# Patient Record
Sex: MALE | Race: WHITE | Employment: FULL TIME | URBAN - METROPOLITAN AREA
[De-identification: names, ages, dates, MRNs, and addresses within clinical notes are randomized per-mention and may not be internally consistent; named-entity substitution may affect disease eponyms.]

---

## 2019-06-04 ENCOUNTER — OFFICE VISIT (OUTPATIENT)
Dept: SLEEP MEDICINE | Age: 44
End: 2019-06-04

## 2019-06-04 VITALS
SYSTOLIC BLOOD PRESSURE: 124 MMHG | DIASTOLIC BLOOD PRESSURE: 73 MMHG | HEIGHT: 71 IN | RESPIRATION RATE: 21 BRPM | OXYGEN SATURATION: 94 % | HEART RATE: 86 BPM | BODY MASS INDEX: 37.66 KG/M2 | WEIGHT: 269 LBS

## 2019-06-04 DIAGNOSIS — G47.33 OSA (OBSTRUCTIVE SLEEP APNEA): Primary | ICD-10-CM

## 2019-06-04 DIAGNOSIS — E66.01 SEVERE OBESITY (HCC): ICD-10-CM

## 2019-06-04 RX ORDER — MOMETASONE FUROATE 50 UG/1
2 SPRAY, METERED NASAL DAILY
COMMUNITY

## 2019-06-04 RX ORDER — CETIRIZINE HCL 10 MG
TABLET ORAL
COMMUNITY

## 2019-06-04 RX ORDER — ESCITALOPRAM OXALATE 10 MG/1
10 TABLET ORAL DAILY
COMMUNITY
End: 2022-09-27

## 2019-06-04 RX ORDER — BUPROPION HYDROCHLORIDE 150 MG/1
150 TABLET ORAL
COMMUNITY

## 2019-06-04 RX ORDER — ATORVASTATIN CALCIUM 20 MG/1
TABLET, FILM COATED ORAL DAILY
COMMUNITY

## 2019-06-04 NOTE — PROGRESS NOTES
[address] [place], [address]  Tel.  [phone]  Fax. [address] [facility] [address]  [place], [address]  Tel.  [phone]  Fax. [phone] [address]  Tel.  [phone]  Fax. [phone]       Chief Complaint       Chief Complaint   Patient presents with    Sleep Problem     Np self refd, on pap, bring machine        HPI      Wilian Zhu is 39 y.o. male seen for evaluation of a sleep disorder. He had an initial evaluation years ago at [facility] where he was diagnosed with sleep apnea. He has continued on the same unit. He has been obtaining supplies online. Unit broken and was unable to provide compliance data. He works as a . When not working he will retire between 9-10 PM and awaken at 7 AM.  He notes that he may be fatigued during the day if he is an active such as when reading or watching TV. He has a history of snoring and associated apnea. He notes nightmares and vivid dreaming and bruxism. He denies sleep talking or sleepwalking, nocturnal incontinence, headache on awakening, hypnagogic hallucinations, sleep paralysis or cataplexy. The patient has not undergone recent diagnostic testing for the current problems. [place] Sleepiness Score: 14       Allergies   Allergen Reactions    Penicillins Nausea and Vomiting       Current Outpatient Medications   Medication Sig Dispense Refill    buPROPion XL (WELLBUTRIN XL) 150 mg tablet Take 150 mg by mouth every morning.  escitalopram oxalate (LEXAPRO) 10 mg tablet Take 10 mg by mouth daily.  atorvastatin (LIPITOR) 20 mg tablet Take  by mouth daily.  mometasone (NASONEX) 50 mcg/actuation nasal spray 2 Sprays daily.  cetirizine (ZYRTEC) 10 mg tablet Take  by mouth. He  has no past medical history on file. He  has no past surgical history on file. He family history is not on file.     He reports that he has quit smoking. He has never used smokeless tobacco. He reports that he drinks alcohol. Review of Systems:  Review of Systems   Constitutional: Negative for chills and fever. HENT: Positive for hearing loss, sore throat and tinnitus. Eyes: Negative for blurred vision and double vision. Respiratory: Negative for cough and shortness of breath. Cardiovascular: Negative for chest pain and palpitations. Gastrointestinal: Positive for heartburn. Genitourinary: Negative for frequency and urgency. Musculoskeletal: Positive for back pain and neck pain. Skin: Positive for itching and rash. Neurological: Negative for dizziness and headaches. Psychiatric/Behavioral: Positive for depression and memory loss. Objective:     Visit Vitals  /73 (BP 1 Location: Left arm, BP Patient Position: Sitting)   Pulse 86   Resp 21   Ht 5' 11\" (1.803 m)   Wt 269 lb (122 kg)   SpO2 94%   BMI 37.52 kg/m²     Body mass index is 37.52 kg/m². General:   Conversant, cooperative   Eyes:  Pupils equal and reactive, no nystagmus   Oropharynx:   Mallampati score III, tongue normal   Tonsils:   t   Neck:   No carotid bruits; Neck circ. in \"inches\": 17   Chest/Lungs:  Clear on auscultation    CVS:  Normal rate, regular rhythm   Skin:  Warm to touch; no obvious rashes   Neuro:  Speech fluent, face symmetrical, tongue movement normal   Psych:  Normal affect,  normal countenance        Assessment:       ICD-10-CM ICD-9-CM    1. ISABEL (obstructive sleep apnea) G47.33 327.23 SLEEP STUDY UNATTENDED, 4 CHANNEL   2. Severe obesity (HCC) E66.01 278.01        History of sleep disordered breathing. CPAP unit not providing compliance data. He will be reevaluated with a home sleep test.  Results will be reviewed with him.     Plan:     Orders Placed This Encounter    SLEEP STUDY UNATTENDED, 4 CHANNEL     Order Specific Question:   Reason for Exam     Answer:   History of sleep apnea, reevaluation       * Patient has a history and examination consistent with the diagnosis of sleep apnea. *Home sleep testing was ordered for initial evaluation. * He was provided information on sleep apnea including corresponding risk factors and the importance of proper treatment. * Treatment options if indicated were reviewed today. Instructions:  o The patient would benefit from weight reduction measures. o Do not engage in activities requiring a normal degree of alertness if fatigue is present. o The patient understands that untreated or undertreated sleep apnea could impair judgement and the ability to function normally during the day.  o Call or return if symptoms worsen or persist.          Ritu Hughes MD, FAASM  Electronically signed 06/04/19       This note was created using voice recognition software. Despite editing, there may be syntax errors. This note will not be viewable in [address]

## 2019-06-04 NOTE — PATIENT INSTRUCTIONS

## 2019-06-06 ENCOUNTER — DOCUMENTATION ONLY (OUTPATIENT)
Dept: SLEEP MEDICINE | Age: 44
End: 2019-06-06

## 2019-06-11 ENCOUNTER — TELEPHONE (OUTPATIENT)
Dept: SLEEP MEDICINE | Age: 44
End: 2019-06-11

## 2019-06-11 DIAGNOSIS — G47.33 OSA (OBSTRUCTIVE SLEEP APNEA): Primary | ICD-10-CM

## 2019-06-11 NOTE — TELEPHONE ENCOUNTER
HSAT Returned-[facility]    Date of Study: 6/4/19    The following information was gathered from the patients study log:  Further information provided:Log scanned into media    Flow and oximetry was poor for a portion of the study.

## 2019-06-12 ENCOUNTER — TELEPHONE (OUTPATIENT)
Dept: SLEEP MEDICINE | Age: 44
End: 2019-06-12

## 2019-06-12 NOTE — TELEPHONE ENCOUNTER
HSAT demonstrated severe sleep disordered breathing characterized by an AHI of 46.7/h associated with minimal SaO2 of 75%. Snoring during 16.4% of the study.     Recommendation: APAP 7-16 cm.     Chief technologist: Please review the HSAT results with the patient. Order has been generated for APAP 7-16 cm. Please schedule first compliance appointment.

## 2019-06-17 ENCOUNTER — DOCUMENTATION ONLY (OUTPATIENT)
Dept: SLEEP MEDICINE | Age: 44
End: 2019-06-17

## 2019-09-06 ENCOUNTER — OFFICE VISIT (OUTPATIENT)
Dept: SLEEP MEDICINE | Age: 44
End: 2019-09-06

## 2019-09-06 VITALS
HEIGHT: 71 IN | HEART RATE: 76 BPM | BODY MASS INDEX: 37.94 KG/M2 | OXYGEN SATURATION: 98 % | DIASTOLIC BLOOD PRESSURE: 73 MMHG | SYSTOLIC BLOOD PRESSURE: 131 MMHG | WEIGHT: 271 LBS

## 2019-09-06 DIAGNOSIS — G47.33 OSA (OBSTRUCTIVE SLEEP APNEA): Primary | ICD-10-CM

## 2019-09-06 NOTE — PATIENT INSTRUCTIONS
[address] [facility] [address] [place], [address]  Tel.  [phone]  Fax. 100 [facility] 60  [place], [address] [facility]  Tel.  [phone]  Fax. [phone] [address]  Tel.  [phone]  Fax. [phone]     Learning About CPAP for Sleep Apnea  What is CPAP? CPAP is a small machine that you use at home every night while you sleep. It increases air pressure in your throat to keep your airway open. When you have sleep apnea, this can help you sleep better so you feel much better. CPAP stands for \"continuous positive airway pressure. \"  The CPAP machine will have one of the following:  · A mask that covers your nose and mouth  · Prongs that fit into your nose  · A mask that covers your nose only, the most common type. This type is called NCPAP. The N stands for \"nasal.\"  Why is it done? CPAP is usually the best treatment for obstructive sleep apnea. It is the first treatment choice and the most widely used. Your doctor may suggest CPAP if you have:  · Moderate to severe sleep apnea. · Sleep apnea and coronary artery disease (CAD) or heart failure. How does it help? · CPAP can help you have more normal sleep, so you feel less sleepy and more alert during the daytime. · CPAP may help keep heart failure or other heart problems from getting worse. · NCPAP may help lower your blood pressure. · If you use CPAP, your bed partner may also sleep better because you are not snoring or restless. What are the side effects? Some people who use CPAP have:  · A dry or stuffy nose and a sore throat. · Irritated skin on the face. · Sore eyes. · Bloating. If you have any of these problems, work with your doctor to fix them. Here are some things you can try:  · Be sure the mask or nasal prongs fit well. · See if your doctor can adjust the pressure of your CPAP. · If your nose is dry, try a humidifier.   · If your nose is runny or stuffy, try decongestant medicine or a steroid nasal spray. If these things do not help, you might try a different type of machine. Some machines have air pressure that adjusts on its own. Others have air pressures that are different when you breathe in than when you breathe out. This may reduce discomfort caused by too much pressure in your nose. Where can you learn more? Go to SHADOW.be  Enter Nicolasa Haynes in the search box to learn more about \"Learning About CPAP for Sleep Apnea. \"   © 0954-9379 Healthwise, Incorporated. Care instructions adapted under license by [facility] [employer] (which disclaims liability or warranty for this information). This care instruction is for use with your licensed healthcare professional. If you have questions about a medical condition or this instruction, always ask your healthcare professional. [address] any warranty or liability for your use of this information. Content Version: 2.8.08328; Last Revised: January 11, 2010  PROPER SLEEP HYGIENE    What to avoid  · Do not have drinks with caffeine, such as coffee or black tea, for 8 hours before bed. · Do not smoke or use other types of tobacco near bedtime. Nicotine is a stimulant and can keep you awake. · Avoid drinking alcohol late in the evening, because it can cause you to wake in the middle of the night. · Do not eat a big meal close to bedtime. If you are hungry, eat a light snack. · Do not drink a lot of water close to bedtime, because the need to urinate may wake you up during the night. · Do not read or watch TV in bed. Use the bed only for sleeping and sexual activity. What to try  · Go to bed at the same time every night, and wake up at the same time every morning. Do not take naps during the day. · Keep your bedroom quiet, dark, and cool. · Get regular exercise, but not within 3 to 4 hours of your bedtime. .  · Sleep on a comfortable pillow and mattress.   · If watching the clock makes you anxious, turn it facing away from you so you cannot see the time. · If you worry when you lie down, start a worry book. Well before bedtime, write down your worries, and then set the book and your concerns aside. · Try meditation or other relaxation techniques before you go to bed. · If you cannot fall asleep, get up and go to another room until you feel sleepy. Do something relaxing. Repeat your bedtime routine before you go to bed again. · Make your house quiet and calm about an hour before bedtime. Turn down the lights, turn off the TV, log off the computer, and turn down the volume on music. This can help you relax after a busy day. Drowsy Driving: The [employer] cites drowsiness as a causing factor in more than 892,308 police reported crashes annually, resulting in 76,000 injuries and 1,500 deaths. Other surveys suggest 55% of people polled have driven while drowsy in the past year, 23% had fallen asleep but not crashed, 3% crashed, and 2% had and accident due to drowsy driving. Who is at risk? Young Drivers: One study of drowsy driving accidents states that 55% of the drivers were under 25 years. Of those, 75% were male. Shift Workers and Travelers: People who work overnight or travel across time zones frequently are at higher risk of experiencing Circadian Rhythm Disorders. They are trying to work and function when their body is programed to sleep. Sleep Deprived: Lack of sleep has a serious impact on your ability to pay attention or focus on a task. Consistently getting less than the average of 8 hours your body needs creates partial or cumulative sleep deprivation. Untreated Sleep Disorders: Sleep Apnea, Narcolepsy, R.L.S., and other sleep disorders (untreated) prevent a person from getting enough restful sleep. This leads to excessive daytime sleepiness and increases the risk for drowsy driving accidents by up to 7 times.   Medications / Alcohol: Even over the counter medications can cause drowsiness. Medications that impair a drivers attention should have a warning label. Alcohol naturally makes you sleepy and on its own can cause accidents. Combined with excessive drowsiness its effects are amplified. Signs of Drowsy Driving:   * You don't remember driving the last few miles   * You may drift out of your edwin   * You are unable to focus and your thoughts wander   * You may yawn more often than normal   * You have difficulty keeping your eyes open / nodding off   * Missing traffic signs, speeding, or tailgating  Prevention-   Good sleep hygiene, lifestyle and behavioral choices have the most impact on drowsy driving. There is no substitute for sleep and the average person requires 8 hours nightly. If you find yourself driving drowsy, stop and sleep. Consider the sleep hygiene tips provided during your visit as well. Medication Refill Policy: Refills for all medications require 1 week advance notice. Please have your pharmacy fax a refill request. We are unable to fax, or call in \"controled substance\" medications and you will need to pick these prescriptions up from our office. Dittit Activation    Thank you for requesting access to Dittit. Please follow the instructions below to securely access and download your online medical record. Dittit allows you to send messages to your doctor, view your test results, renew your prescriptions, schedule appointments, and more. How Do I Sign Up? 1. In your internet browser, go to [email or web address]. 2. Click on the First Time User? Click Here link in the Sign In box. You will see the New Member Sign Up page. 3. Enter your Dittit Access Code exactly as it appears below. You will not need to use this code after youve completed the sign-up process. If you do not sign up before the expiration date, you must request a new code.     Dittit Access Code: [email or web address]  Expires: 10/21/2019  8:46 AM (This is the date your ExoYou access code will )    4. Enter the last four digits of your Social Security Number (xxxx) and Date of Birth (mm/dd/yyyy) as indicated and click Submit. You will be taken to the next sign-up page. 5. Create a ExoYou ID. This will be your ExoYou login ID and cannot be changed, so think of one that is secure and easy to remember. 6. Create a ExoYou password. You can change your password at any time. 7. Enter your Password Reset Question and Answer. This can be used at a later time if you forget your password. 8. Enter your e-mail address. You will receive e-mail notification when new information is available in [address] 9. Click Sign Up. You can now view and download portions of your medical record. 10. Click the Download Summary menu link to download a portable copy of your medical information. Additional Information    If you have questions, please call [phone]. Remember, ExoYou is NOT to be used for urgent needs. For medical emergencies, dial 911.

## 2019-09-06 NOTE — PROGRESS NOTES
[address] [facility] [address] [place], [address]   Tel.  [phone]   Fax. 100 [facility] 60   [place], [address] [facility]   Tel.  [phone]   Fax. [phone] [address]    Tel.  [phone]   Fax. [phone]       Subjective:   Jose Galindo is a 39 y.o. male seen for a positive airway pressure follow-up, last seen by Dr. Jennifer Wu on 6/14/2019, prior notes reviewed in detail. Home sleep test demonstrated severe sleep disordered breathing characterized by an AHI of 46.7/h associated with minimal SaO2 of 75%.  Snoring during 16.4% of the study. APAP device 7-16 ordered. He is here for his first adherence visit. He reports no problems using the device. The following concerns reviewed:    Drowsiness no Problems exhaling no   Snoring no Forget to put on no   Mask Comfortable yes Can't fall asleep no   Dry Mouth no Mask falls off no   Air Leaking no Frequent awakenings no       He admits that his sleep has improved on PAP therapy using nasal pillows mask and heated tubing. Auto pressure: 7-16 cmH2O. Compliance data downloaded and reviewed in detail with the patient today. CPAP used during 30 days with the average daily use of 8:48 hours. CMS Compliance % Used Days >= 4 hours: 100. 95th Percentile Leak: 13.2 L/Minute. Therapy Apnea Index averaged over PAP use: 0.8 /hr which reflects improved sleep breathing condition. [place] Sleepiness Score: 12 and Modified F.O.S.Q. Score Total / 2: 15.5       Allergies   Allergen Reactions    Penicillins Nausea and Vomiting       He has a current medication list which includes the following prescription(s): bupropion xl, escitalopram oxalate, atorvastatin, mometasone, and cetirizine. .      He  has no past medical history on file. Sleep Review of Systems: notable for Positive difficulty falling asleep;  Positive awakenings at night; Negative early morning headaches; Negative memory problems; Negative concentration issues; Negative chest pain; Negative shortness of breath; Negative significant joint pain at night; Negative significant muscle pain at night; Negative rashes or itching; Negative heartburn; Negative significant mood issues; 2-3 afternoon naps per week; Negative history of any automobile or occupational accidents due to daytime drowsiness      Objective:     Visit Vitals  /73 (BP 1 Location: Left arm, BP Patient Position: Sitting)   Pulse 76   Ht 5' 11\" (1.803 m)   Wt 271 lb (122.9 kg)   SpO2 98%   BMI 37.80 kg/m²            General:   Alert, oriented, not in acute distress   Eyes:  Anicteric Sclerae; no obvious strabismus   Nose:  No obvious nasal septum deviation    Oropharynx:   Mallampati score 4, thick tongue base, uvula not seen due to low-lying soft palate, narrow tonsilo-pharyngeal pilars   Neck:   Midline trachea   Chest/Lungs:  Symmetrical lung expansion, clear lung fields on auscultation    CVS:  Normal rate, regular rhythm,  no JVD   Extremities:  No obvious rashes, no edema    Neuro:  No focal deficits; No obvious tremor    Psych:  Normal affect,  normal countenance       Assessment:       ICD-10-CM ICD-9-CM    1. ISABEL (obstructive sleep apnea) G47.33 327.23    2. Adult BMI 37.0-37.9 kg/sq m Z68.37 V85.37        AHI = 46.7(6/2019). On APAP :  7-16 cmH2O. He is compliant with PAP therapy and PAP continues to benefit patient and remains necessary for control of his sleep apnea. Plan:     Follow-up and Dispositions    · Return in about 1 year (around 9/6/2020). * Continue on current pressures     * Counseling was provided regarding the importance of regular PAP use with emphasis on ensuring sufficient total sleep time, proper sleep hygiene, and safe driving. * He reports he is a  and gets calls at night when affects his sleep schedule. We discussed strategies for returning to sleep and falling asleep when he is awakened.     * Re-enforced proper and regular cleaning for the device. * He was asked to contact our office for any problems regarding PAP therapy. 2. Recommended a dedicated weight loss program through appropriate diet and exercise regimen as significant weight reduction has been shown to reduce severity of obstructive sleep apnea. Patient's phone number [phone] (home)  was reviewed and confirmed for accuracy. He gives permission for messages regarding results and appointments to be left at that number. MELODY Lares-BC, [address] [place] [address]  Electronically signed.  09/06/19

## 2020-08-20 ENCOUNTER — HOSPITAL ENCOUNTER (OUTPATIENT)
Dept: CT IMAGING | Age: 45
Discharge: HOME | End: 2020-08-20
Attending: NURSE PRACTITIONER
Payer: COMMERCIAL

## 2020-08-20 DIAGNOSIS — R51.9 HA (HEADACHE): ICD-10-CM

## 2020-08-20 PROCEDURE — 74011000636 HC RX REV CODE- 636: Performed by: RADIOLOGY

## 2020-08-20 PROCEDURE — 70470 CT HEAD/BRAIN W/O & W/DYE: CPT

## 2020-08-20 RX ORDER — SODIUM CHLORIDE 0.9 % (FLUSH) 0.9 %
10 SYRINGE (ML) INJECTION
Status: COMPLETED | OUTPATIENT
Start: 2020-08-20 | End: 2020-08-20

## 2020-08-20 RX ADMIN — IOPAMIDOL 100 ML: 755 INJECTION, SOLUTION INTRAVENOUS at 07:29

## 2020-08-20 RX ADMIN — Medication 10 ML: at 07:29

## 2020-09-22 ENCOUNTER — VIRTUAL VISIT (OUTPATIENT)
Dept: SLEEP MEDICINE | Age: 45
End: 2020-09-22
Payer: COMMERCIAL

## 2020-09-22 ENCOUNTER — DOCUMENTATION ONLY (OUTPATIENT)
Dept: SLEEP MEDICINE | Age: 45
End: 2020-09-22

## 2020-09-22 DIAGNOSIS — G47.33 OSA (OBSTRUCTIVE SLEEP APNEA): Primary | ICD-10-CM

## 2020-09-22 PROCEDURE — 99213 OFFICE O/P EST LOW 20 MIN: CPT | Performed by: INTERNAL MEDICINE

## 2020-09-22 NOTE — PATIENT INSTRUCTIONS
217 [facility]., [address] 1668 [facility], [address] Tel.  [phone] Fax. 100 [facility] 60 [place], 200 [address] [facility] Tel.  [phone] Fax. [phone] [address] [facility] Ruchi Garcia Tel.  [phone] Fax. [phone] PROPER SLEEP HYGIENE What to avoid · Do not have drinks with caffeine, such as coffee or black tea, for 8 hours before bed. · Do not smoke or use other types of tobacco near bedtime. Nicotine is a stimulant and can keep you awake. · Avoid drinking alcohol late in the evening, because it can cause you to wake in the middle of the night. · Do not eat a big meal close to bedtime. If you are hungry, eat a light snack. · Do not drink a lot of water close to bedtime, because the need to urinate may wake you up during the night. · Do not read or watch TV in bed. Use the bed only for sleeping and sexual activity. What to try · Go to bed at the same time every night, and wake up at the same time every morning. Do not take naps during the day. · Keep your bedroom quiet, dark, and cool. · Get regular exercise, but not within 3 to 4 hours of your bedtime. Mikhail Mccarthy · Sleep on a comfortable pillow and mattress. · If watching the clock makes you anxious, turn it facing away from you so you cannot see the time. · If you worry when you lie down, start a worry book. Well before bedtime, write down your worries, and then set the book and your concerns aside. · Try meditation or other relaxation techniques before you go to bed. · If you cannot fall asleep, get up and go to another room until you feel sleepy. Do something relaxing. Repeat your bedtime routine before you go to bed again. · Make your house quiet and calm about an hour before bedtime. Turn down the lights, turn off the TV, log off the computer, and turn down the volume on music. This can help you relax after a busy day. Drowsy Driving [employer] cites drowsiness as a causing factor in more than 005,958 police reported crashes annually, resulting in 76,000 injuries and 1,500 deaths. Other surveys suggest 55% of people polled have driven while drowsy in the past year, 23% had fallen asleep but not crashed, 3% crashed, and 2% had and accident due to drowsy driving. Who is at risk? Young Drivers: One study of drowsy driving accidents states that 55% of the drivers were under 25 years. Of those, 75% were male. Shift Workers and Travelers: People who work overnight or travel across time zones frequently are at higher risk of experiencing Circadian Rhythm Disorders. They are trying to work and function when their body is programed to sleep. Sleep Deprived: Lack of sleep has a serious impact on your ability to pay attention or focus on a task. Consistently getting less than the average of 8 hours your body needs creates partial or cumulative sleep deprivation. Untreated Sleep Disorders: Sleep Apnea, Narcolepsy, R.L.S., and other sleep disorders (untreated) prevent a person from getting enough restful sleep. This leads to excessive daytime sleepiness and increases the risk for drowsy driving accidents by up to 7 times. Medications / Alcohol: Even over the counter medications can cause drowsiness. Medications that impair a drivers attention should have a warning label. Alcohol naturally makes you sleepy and on its own can cause accidents. Combined with excessive drowsiness its effects are amplified. Signs of Drowsy Driving: * You don't remember driving the last few miles * You may drift out of your edwin * You are unable to focus and your thoughts wander * You may yawn more often than normal 
 * You have difficulty keeping your eyes open / nodding off * Missing traffic signs, speeding, or tailgating Prevention-  
Good sleep hygiene, lifestyle and behavioral choices have the most impact on drowsy driving. There is no substitute for sleep and the average person requires 8 hours nightly. If you find yourself driving drowsy, stop and sleep. Consider the sleep hygiene tips provided during your visit as well. Medication Refill Policy: Refills for all medications require 1 week advance notice. Please have your pharmacy fax a refill request. We are unable to fax, or call in \"controled substance\" medications and you will need to pick these prescriptions up from our office. MyChart Activation Thank you for requesting access to [employer]. Please follow the instructions below to securely access and download your online medical record. [employer] allows you to send messages to your doctor, view your test results, renew your prescriptions, schedule appointments, and more. How Do I Sign Up? 1. In your internet browser, go to [email or web address]. 2. Click on the First Time User? Click Here link in the Sign In box. You will see the New Member Sign Up page. 3. Enter your [employer] Access Code exactly as it appears below. You will not need to use this code after youve completed the sign-up process. If you do not sign up before the expiration date, you must request a new code. [employer] Access Code: [email or web address] Expires: 2020  4:17 PM (This is the date your Cloud[employer] access code will ) 4. Enter the last four digits of your Social Security Number (xxxx) and Date of Birth (mm/dd/yyyy) as indicated and click Submit. You will be taken to the next sign-up page. 5. Create a Social Plust ID. This will be your CloudSplit login ID and cannot be changed, so think of one that is secure and easy to remember. 6. Create a [employer] password. You can change your password at any time. 7. Enter your Password Reset Question and Answer. This can be used at a later time if you forget your password. 8. Enter your e-mail address.  You will receive e-mail notification when new information is available in [employer]. 9. Click Sign Up. You can now view and download portions of your medical record. 10. Click the Download Summary menu link to download a portable copy of your medical information. Additional Information If you have questions, please call [phone]. Remember, [employer] is NOT to be used for urgent needs. For medical emergencies, dial 911.

## 2020-09-22 NOTE — PROGRESS NOTES
217 [facility]. [place], [address]  Tel.  [phone]  Fax. 100 [facility] 60  [place], [address] [facility]  Tel.  [phone]  Fax. [phone] 9250 [facility] Ruchi Garcia   Tel.  [phone]  Fax. [phone]       Telemedicine visit performed with verbal consent of the patient. Patient called and identity confirmed with 2 patient identifers    Patient was seen at his   Chad Horn is a 38 y.o. male who was seen by synchronous (real-time) audio-video technology on 9/22/2020. Consent:  He and/or his healthcare decision maker is aware that this patient-initiated Telehealth encounter is the equivalent to a face to face encounter in the sleep disorder center and has provided verbal consent to proceed: Yes    I was at home while conducting this encounter. S>Anthony Haq is a 38 y.o. male seen at this telemedicine visit for a positive airway pressure follow-up. He reports no problems using the device. He is 100% compliant over the past 30 days. The following problems are identified:    Drowsiness no Problems exhaling no   Snoring no Forget to put on no   Mask Comfortable yes  Can't fall asleep no   Dry Mouth no Mask falls off no   Air Leaking no Frequent awakenings no       Download reviewed. He admits that his sleep has improved on PAP therapy using nasal pillow mask and heated tubing. Allergies   Allergen Reactions    Penicillins Nausea and Vomiting       He has a current medication list which includes the following prescription(s): bupropion xl, escitalopram oxalate, atorvastatin, mometasone, and cetirizine. .      He  has no past medical history on file. [place] Sleepiness Score: 10   and Modified F.O.S.Q. Score Total / 2: 13    O>      There were no vitals taken for this visit.       Vital Signs: (As obtained by patient/caregiver at home)        Constitutional: [x] Appears well-developed and well-nourished [x] No apparent distress      [] Abnormal -     Mental status: [x] Alert and awake  [x] Oriented to person/place/time [x] Able to follow commands    [] Abnormal -     Eyes:   EOM    [x]  Normal    [] Abnormal -   Sclera  [x]  Normal    [] Abnormal -          Discharge [x]  None visible   [] Abnormal -     HENT: [x] Normocephalic, atraumatic  [] Abnormal -     External Ears [x] Normal  [] Abnormal -    Neck: [x] No visualized mass [] Abnormal -     Pulmonary/Chest: [x] Respiratory effort normal   [x] No visualized signs of difficulty breathing or respiratory distress        [] Abnormal -       Neurological:        [x] No Facial Asymmetry (Cranial nerve 7 motor function) (limited exam due to video visit)          [x] No gaze palsy        [] Abnormal -          Skin:        [x] No significant exanthematous lesions or discoloration noted on facial skin         [] Abnormal -            Psychiatric:       [x] Normal Affect [] Abnormal -        Other pertinent observable physical exam findings:-            A>    ICD-10-CM ICD-9-CM    1. ISABEL (obstructive sleep apnea)  G47.33 327.23 AMB SUPPLY ORDER     AHI = 47 (6-19). On CPAP, Resmed :  7-16 cmH2O. Compliant:      yes    Therapeutic Response:  Positive    P>    he is compliant with PAP therapy and PAP continues to benefit patient and remains necessary for control of his sleep apnea. CPAP setting - continue current settings     * We have recommended a dedicated weight loss through appropriate diet and an exercise regimen as significant weight reduction has been shown to reduce severity of obstructive sleep apnea. *   Follow-up and Dispositions    · Return in about 1 year (around 9/22/2021). * He was asked to contact our office for any problems regarding PAP therapy. * Counseling was provided regarding the importance of regular PAP use and on proper sleep hygiene and safe driving. * Re-enforced proper and regular cleaning for the device.     All of his questions were addressed. Pursuant to the emergency declaration under the [facility], [facility] waiver authority and the [employer] and [employer] Act, this Virtual  Visit was conducted, with patient's consent, to reduce the patient's risk of exposure to COVID-19 and provide continuity of care for an established patient. Services were provided through a video synchronous discussion virtually to substitute for in-person clinic visit.     Vitaliy Mendoza MD    Electronically signed by    Francisco Ruiz MD  Diplomate in Sleep Medicine  [facility]

## 2020-10-01 ENCOUNTER — OFFICE VISIT (OUTPATIENT)
Dept: NEUROLOGY | Age: 45
End: 2020-10-01
Payer: COMMERCIAL

## 2020-10-01 VITALS
HEIGHT: 71 IN | RESPIRATION RATE: 12 BRPM | HEART RATE: 66 BPM | DIASTOLIC BLOOD PRESSURE: 82 MMHG | SYSTOLIC BLOOD PRESSURE: 140 MMHG | TEMPERATURE: 98 F | WEIGHT: 262 LBS | BODY MASS INDEX: 36.68 KG/M2 | OXYGEN SATURATION: 96 %

## 2020-10-01 DIAGNOSIS — M26.609 TMJ (TEMPOROMANDIBULAR JOINT SYNDROME): ICD-10-CM

## 2020-10-01 DIAGNOSIS — G44.84 PRIMARY EXERTIONAL HEADACHE: ICD-10-CM

## 2020-10-01 DIAGNOSIS — Z82.3 FAMILY HISTORY OF CEREBROVASCULAR ACCIDENT (CVA) DUE TO ANEURYSM: ICD-10-CM

## 2020-10-01 DIAGNOSIS — G44.82 COITAL HEADACHE: Primary | ICD-10-CM

## 2020-10-01 PROCEDURE — 99244 OFF/OP CNSLTJ NEW/EST MOD 40: CPT | Performed by: PSYCHIATRY & NEUROLOGY

## 2020-10-01 RX ORDER — IXEKIZUMAB 80 MG/ML
INJECTION, SOLUTION SUBCUTANEOUS SEE ADMIN INSTRUCTIONS
COMMUNITY

## 2020-10-01 NOTE — LETTER
10/1/20 Patient: Shruthi Daigle YOB: 1975 Date of Visit: 10/1/2020 [address]  [place] [address] VIA In Basket Dear Samuel Enrique MD, Thank you for referring Mr. Shruthi Daigle to [address] [place] [address] for evaluation. My notes for this consultation are attached. Consult REFERRED BY: 
Monique Camarillo MD 
 
CHIEF COMPLAINT: Severe headache Subjective:  
 
Shruthi Daigle is a 38 y.o. right-handed  male seen as a new patient to me at the request of Dr. Lew Ahumada for evaluation of recurring severe incapacitating exertional type headache that came on about 3 years ago for the first time twice during coitus, and then recurred 1 year ago again with coitus and then several months ago when he was lifting weights and was exerting himself with 1 more curl and then suddenly had severe headache that lasted several months that began in a generalized fashion, but seem to be more in the posterior head regions and the right temporal area, and still has a vague tightness or dull sensation on that side still. 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 He does not have many headaches in between these severe ones other than just routine headaches. His grandmother had an aneurysm in the brain and  of that. He had no focal weakness no sensory loss no cranial nerve problem with the headaches, but did have phonophobia and photophobia and nausea but no vomiting with the headaches. His only other medical problems include psoriasis, hyperlipidemia, depression, obstructive sleep apnea. He has had no unusual fever, meningismus, history of head trauma, or other precipitating causes for the headaches. Patient had a normal CT of the head in 2020 for these headaches. Patient was given medication of steroids and diclofenac for his severe headache when he had them each time. Past Medical History:  
Diagnosis Date  Headache History reviewed. No pertinent surgical history. Family History Problem Relation Age of Onset  Migraines Mother  Neuropathy Mother  No Known Problems Father  Diabetes Maternal Grandmother  No Known Problems Child  Other Paternal Grandmother   
     cerebral aneurysm Social History Tobacco Use  Smoking status: Former Smoker  Smokeless tobacco: Never Used Substance Use Topics  Alcohol use: Yes Comment: OCC Current Outpatient Medications:  
  ixekizumab (Taltz Autoinjector, 2 Pack,) 80 mg/mL auto injector, by SubCUTAneous route See Admin Instructions. Inject 160 mg (two 80 mg) syringes at week 0 followed by 80 mg week 2, 4, 6, 8, 10, 12 then 80 mg every 4 weeks. , Disp: , Rfl:  
  buPROPion XL (WELLBUTRIN XL) 150 mg tablet, Take 150 mg by mouth every morning., Disp: , Rfl:  
  escitalopram oxalate (LEXAPRO) 10 mg tablet, Take 10 mg by mouth daily. , Disp: , Rfl:  
  atorvastatin (LIPITOR) 20 mg tablet, Take  by mouth daily. , Disp: , Rfl:  
  mometasone (NASONEX) 50 mcg/actuation nasal spray, 2 Sprays daily. , Disp: , Rfl:  
  cetirizine (ZYRTEC) 10 mg tablet, Take  by mouth., Disp: , Rfl:  
 
 
 
Allergies Allergen Reactions  Penicillins Nausea and Vomiting MRI Results (most recent): No results found for this or any previous visit. No results found for this or any previous visit. Review of Systems: A comprehensive review of systems was negative except for: Musculoskeletal: positive for myalgias, arthralgias, stiff joints and neck pain Neurological: positive for headaches Behvioral/Psych: positive for depression Vitals:  
 10/01/20 0759 BP: (!) 140/82 Pulse: 66 Resp: 12 Temp: 98 °F (36.7 °C) SpO2: 96% Weight: 262 lb (118.8 kg) Height: 5' 11\" (1.803 m) Objective: I 
 
 
NEUROLOGICAL EXAM: 
 
Appearance: The patient is well developed, well nourished, provides a coherent history and is in no acute distress. Mental Status: Oriented to time, place and person, and the president, cognitive function is normal and speech is fluent and no aphasia or dysarthria. Mood and affect appropriate. Cranial Nerves:   Intact visual fields. Fundi are benign, disc are flat, no lesions seen on funduscopy. LINCOLN, EOM's full, no nystagmus, no ptosis. Facial sensation is normal. Corneal reflexes are not tested. Facial movement is symmetric. Hearing is normal bilaterally. Palate is midline with normal sternocleidomastoid and trapezius muscles are normal. Tongue is midline. Neck without meningismus or bruits Temporal arteries are not tender or enlarged TMJ areas are not tender on palpation Motor:  5/5 strength in upper and lower proximal and distal muscles. Normal bulk and tone. No fasciculations. Rapid alternating movement is symmetric and intact bilaterally Reflexes:   Deep tendon reflexes 2+/4 and symmetrical. 
No babinski or clonus present Sensory:   Normal to touch, pinprick and vibration and temperature. DSS is intact Gait:  Normal gait for patient's age. Tremor:   No tremor noted. Cerebellar:  No abnormal cerebellar signs present on Romberg and tandem testing and finger-nose-finger exam.  
Neurovascular:  Normal heart sounds and regular rhythm, peripheral pulses intact, and no carotid bruits. Assessment: ICD-10-CM ICD-9-CM 1. Coital headache  G44.82 339.82 MRI BRAIN W WO CONT  
   MRA BRAIN WO CONT  
   JORDIN COMPREHENSIVE PLUS PANEL  
   SED RATE (ESR) 2. Primary exertional headache  G44.84 339.84 MRI BRAIN W WO CONT  
   MRA BRAIN WO CONT  
   JORDIN COMPREHENSIVE PLUS PANEL  
   SED RATE (ESR) 3. Family history of cerebrovascular accident (CVA) due to aneurysm  Z82.3 V17.1 MRI BRAIN W WO CONT  
   MRA BRAIN WO CONT JORDIN COMPREHENSIVE PLUS PANEL  
   SED RATE (ESR) 4. TMJ (temporomandibular joint syndrome)  M26.609 524.60 MRI BRAIN W WO CONT  
   MRA BRAIN WO CONT  
   JORDIN COMPREHENSIVE PLUS PANEL  
   SED RATE (ESR) Active Problems: * No active hospital problems. * 
 
 
Plan:  
 
Patient with history of exertional headaches and coital headaches, with family history of cerebral aneurysm, and patient needs MRA of the brain and MRI of the brain to rule out structural brain lesions, rule out vascular malformation, rule out congenital vascular anomaly, rule out cerebral aneurysm with his family history of aneurysms or other posterior fossa mass lesions like Arnold-Chiari. Patient advised to make sure that he breathes deeply during all exercises and keeps his mouth open, and do the same thing with coitus. Sometimes premedicated himself with anti-inflammatories prior to exertional activities will help prevent the headaches some. A sed rate and JORDIN was also checked today to make sure he does not have any signs of vasculitis or arteritis. Patient encouraged to consider getting a mouthguard made by his oral surgeon if he continues to have tightness in his right temple area where he has the temporal muscle hypertrophy, presumably from bruxism. Patient advised that exertional headaches and coital headaches are usually benign, but we need to rule out the structural lesions or vascular lesions of the brain because they can be associated with these type headaches and are definitely more serious and life-threatening. Patient will check my chart for results of his test, will call us for results. He will call us if there is any recurrent severe headaches in the interim, since is not having any headaches now we did not give him any medication yet.  
When I offered the patient going over his history, going over his exam, reviewing his previous CT scan of the head on the PACS system personally myself, and I do agree that that was normal.  In reviewing his medical records on the chart his medical notes from his sleep specialist. 
 
Signed By: Karina Odom MD   
 October 1, 2020 CC: Luz Marina Miller MD 
FAX: [phone] This note will not be viewable in [address] If you have questions, please do not hesitate to call me. I look forward to following your patient along with you. Sincerely, Karina Odom MD

## 2020-10-01 NOTE — PROGRESS NOTES
Consult  REFERRED BY:  Keith Higuera MD    CHIEF COMPLAINT: Severe headache    Subjective:     Rosalio Ellsworth is a 38 y.o. right-handed  male seen as a new patient to me at the request of Dr. Antonia Jones for evaluation of recurring severe incapacitating exertional type headache that came on about 3 years ago for the first time twice during coitus, and then recurred 1 year ago again with coitus and then several months ago when he was lifting weights and was exerting himself with 1 more curl and then suddenly had severe headache that lasted several months that began in a generalized fashion, but seem to be more in the posterior head regions and the right temporal area, and still has a vague tightness or dull sensation on that side still. 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 He does not have many headaches in between these severe ones other than just routine headaches. His grandmother had an aneurysm in the brain and  of that. He had no focal weakness no sensory loss no cranial nerve problem with the headaches, but did have phonophobia and photophobia and nausea but no vomiting with the headaches. His only other medical problems include psoriasis, hyperlipidemia, depression, obstructive sleep apnea. He has had no unusual fever, meningismus, history of head trauma, or other precipitating causes for the headaches. Patient had a normal CT of the head in 2020 for these headaches. Patient was given medication of steroids and diclofenac for his severe headache when he had them each time. Past Medical History:   Diagnosis Date    Headache       History reviewed. No pertinent surgical history.   Family History   Problem Relation Age of Onset   [de-identified] Migraines Mother     Neuropathy Mother     No Known Problems Father     Diabetes Maternal Grandmother     No Known Problems Child     Other Paternal Grandmother         cerebral aneurysm      Social History     Tobacco Use    Smoking status: Former Smoker    Smokeless tobacco: Never Used   Substance Use Topics    Alcohol use: Yes     Comment: OCC         Current Outpatient Medications:     ixekizumab (Taltz Autoinjector, 2 Pack,) 80 mg/mL auto injector, by SubCUTAneous route See Admin Instructions. Inject 160 mg (two 80 mg) syringes at week 0 followed by 80 mg week 2, 4, 6, 8, 10, 12 then 80 mg every 4 weeks. , Disp: , Rfl:     buPROPion XL (WELLBUTRIN XL) 150 mg tablet, Take 150 mg by mouth every morning., Disp: , Rfl:     escitalopram oxalate (LEXAPRO) 10 mg tablet, Take 10 mg by mouth daily. , Disp: , Rfl:     atorvastatin (LIPITOR) 20 mg tablet, Take  by mouth daily. , Disp: , Rfl:     mometasone (NASONEX) 50 mcg/actuation nasal spray, 2 Sprays daily. , Disp: , Rfl:     cetirizine (ZYRTEC) 10 mg tablet, Take  by mouth., Disp: , Rfl:         Allergies   Allergen Reactions    Penicillins Nausea and Vomiting      MRI Results (most recent):  No results found for this or any previous visit. No results found for this or any previous visit. Review of Systems:  A comprehensive review of systems was negative except for: Musculoskeletal: positive for myalgias, arthralgias, stiff joints and neck pain  Neurological: positive for headaches  Behvioral/Psych: positive for depression   Vitals:    10/01/20 0759   BP: (!) 140/82   Pulse: 66   Resp: 12   Temp: 98 °F (36.7 °C)   SpO2: 96%   Weight: 262 lb (118.8 kg)   Height: 5' 11\" (1.803 m)     Objective:     I      NEUROLOGICAL EXAM:    Appearance: The patient is well developed, well nourished, provides a coherent history and is in no acute distress. Mental Status: Oriented to time, place and person, and the president, cognitive function is normal and speech is fluent and no aphasia or dysarthria. Mood and affect appropriate. Cranial Nerves:   Intact visual fields.  Fundi are benign, disc are flat, no lesions seen on funduscopy. LINCOLN, EOM's full, no nystagmus, no ptosis. Facial sensation is normal. Corneal reflexes are not tested. Facial movement is symmetric. Hearing is normal bilaterally. Palate is midline with normal sternocleidomastoid and trapezius muscles are normal. Tongue is midline. Neck without meningismus or bruits  Temporal arteries are not tender or enlarged  TMJ areas are not tender on palpation   Motor:  5/5 strength in upper and lower proximal and distal muscles. Normal bulk and tone. No fasciculations. Rapid alternating movement is symmetric and intact bilaterally   Reflexes:   Deep tendon reflexes 2+/4 and symmetrical.  No babinski or clonus present   Sensory:   Normal to touch, pinprick and vibration and temperature. DSS is intact   Gait:  Normal gait for patient's age. Tremor:   No tremor noted. Cerebellar:  No abnormal cerebellar signs present on Romberg and tandem testing and finger-nose-finger exam.   Neurovascular:  Normal heart sounds and regular rhythm, peripheral pulses intact, and no carotid bruits. Assessment:       ICD-10-CM ICD-9-CM    1. Coital headache  G44.82 339.82 MRI BRAIN W WO CONT      MRA BRAIN WO CONT      JORDIN COMPREHENSIVE PLUS PANEL      SED RATE (ESR)   2. Primary exertional headache  G44.84 339.84 MRI BRAIN W WO CONT      MRA BRAIN WO CONT      JORDIN COMPREHENSIVE PLUS PANEL      SED RATE (ESR)   3. Family history of cerebrovascular accident (CVA) due to aneurysm  Z82.3 V17.1 MRI BRAIN W WO CONT      MRA BRAIN WO CONT      JORDIN COMPREHENSIVE PLUS PANEL      SED RATE (ESR)   4. TMJ (temporomandibular joint syndrome)  M26.609 524.60 MRI BRAIN W WO CONT      MRA BRAIN WO CONT      JORDIN COMPREHENSIVE PLUS PANEL      SED RATE (ESR)     Active Problems:    * No active hospital problems.  *      Plan:     Patient with history of exertional headaches and coital headaches, with family history of cerebral aneurysm, and patient needs MRA of the brain and MRI of the brain to rule out structural brain lesions, rule out vascular malformation, rule out congenital vascular anomaly, rule out cerebral aneurysm with his family history of aneurysms or other posterior fossa mass lesions like Arnold-Chiari. Patient advised to make sure that he breathes deeply during all exercises and keeps his mouth open, and do the same thing with coitus. Sometimes premedicated himself with anti-inflammatories prior to exertional activities will help prevent the headaches some. A sed rate and JORDIN was also checked today to make sure he does not have any signs of vasculitis or arteritis. Patient encouraged to consider getting a mouthguard made by his oral surgeon if he continues to have tightness in his right temple area where he has the temporal muscle hypertrophy, presumably from bruxism. Patient advised that exertional headaches and coital headaches are usually benign, but we need to rule out the structural lesions or vascular lesions of the brain because they can be associated with these type headaches and are definitely more serious and life-threatening. Patient will check my chart for results of his test, will call us for results. He will call us if there is any recurrent severe headaches in the interim, since is not having any headaches now we did not give him any medication yet. When I offered the patient going over his history, going over his exam, reviewing his previous CT scan of the head on the PACS system personally myself, and I do agree that that was normal.  In reviewing his medical records on the chart his medical notes from his sleep specialist.    Signed By: Asher Tejada MD     October 1, 2020       CC: Garima Vela MD  FAX: [phone]    This note will not be viewable in [address]

## 2020-10-08 ENCOUNTER — TELEPHONE (OUTPATIENT)
Dept: NEUROLOGY | Age: 45
End: 2020-10-08

## 2020-10-08 NOTE — TELEPHONE ENCOUNTER
----- Message from Janie Corbett sent at 10/8/2020  9:39 AM EDT -----  Regarding: Dr. Ai Sherwood telephone  Caller's first and last name: Rony   Reason for call:  violet to violet   Callback required yes/no and why: no   Best contact number(s): [phone]  Details to clarify the request: MRI of brain without contrast at [facility], Dr. Sheng Olson can call aim at [phone] and follow props for pier to pier, contact  rony if any questions [phone]

## 2020-10-12 ENCOUNTER — HOSPITAL ENCOUNTER (OUTPATIENT)
Dept: MRI IMAGING | Age: 45
Discharge: HOME | End: 2020-10-12
Attending: PSYCHIATRY & NEUROLOGY

## 2020-10-12 ENCOUNTER — DOCUMENTATION ONLY (OUTPATIENT)
Dept: NEUROLOGY | Age: 45
End: 2020-10-12

## 2020-10-12 DIAGNOSIS — G44.84 PRIMARY EXERTIONAL HEADACHE: ICD-10-CM

## 2020-10-12 DIAGNOSIS — Z82.3 FAMILY HISTORY OF CEREBROVASCULAR ACCIDENT (CVA) DUE TO ANEURYSM: ICD-10-CM

## 2020-10-12 DIAGNOSIS — G44.84 PRIMARY EXERTIONAL HEADACHE: Primary | ICD-10-CM

## 2020-10-12 DIAGNOSIS — G44.82 COITAL HEADACHE: ICD-10-CM

## 2020-10-12 DIAGNOSIS — M26.609 TMJ (TEMPOROMANDIBULAR JOINT SYNDROME): ICD-10-CM

## 2020-10-12 NOTE — PROGRESS NOTES
Patient cannot get MRA, only part of the MRI because of metal object in his scalp, will order CTA as recommended by interventional neuroradiology rule out aneurysm

## 2021-09-27 ENCOUNTER — DOCUMENTATION ONLY (OUTPATIENT)
Dept: SLEEP MEDICINE | Age: 46
End: 2021-09-27

## 2021-09-27 ENCOUNTER — OFFICE VISIT (OUTPATIENT)
Dept: SLEEP MEDICINE | Age: 46
End: 2021-09-27
Payer: COMMERCIAL

## 2021-09-27 VITALS
SYSTOLIC BLOOD PRESSURE: 126 MMHG | OXYGEN SATURATION: 97 % | DIASTOLIC BLOOD PRESSURE: 79 MMHG | HEART RATE: 71 BPM | RESPIRATION RATE: 20 BRPM | BODY MASS INDEX: 37.95 KG/M2 | TEMPERATURE: 99.1 F | WEIGHT: 272.1 LBS

## 2021-09-27 DIAGNOSIS — G47.33 OBSTRUCTIVE SLEEP APNEA (ADULT) (PEDIATRIC): Primary | ICD-10-CM

## 2021-09-27 PROCEDURE — 99213 OFFICE O/P EST LOW 20 MIN: CPT | Performed by: INTERNAL MEDICINE

## 2021-09-27 NOTE — PATIENT INSTRUCTIONS
[address] [facility] [address] [place], [address]  Tel.  [phone]  Fax. 100 [facility] 60  [place], [address]  Tel.  [phone]  Fax. [phone] [address]  Tel.  [phone]  Fax. [phone]     PROPER SLEEP HYGIENE    What to avoid  · Do not have drinks with caffeine, such as coffee or black tea, for 8 hours before bed. · Do not smoke or use other types of tobacco near bedtime. Nicotine is a stimulant and can keep you awake. · Avoid drinking alcohol late in the evening, because it can cause you to wake in the middle of the night. · Do not eat a big meal close to bedtime. If you are hungry, eat a light snack. · Do not drink a lot of water close to bedtime, because the need to urinate may wake you up during the night. · Do not read or watch TV in bed. Use the bed only for sleeping and sexual activity. What to try  · Go to bed at the same time every night, and wake up at the same time every morning. Do not take naps during the day. · Keep your bedroom quiet, dark, and cool. · Get regular exercise, but not within 3 to 4 hours of your bedtime. .  · Sleep on a comfortable pillow and mattress. · If watching the clock makes you anxious, turn it facing away from you so you cannot see the time. · If you worry when you lie down, start a worry book. Well before bedtime, write down your worries, and then set the book and your concerns aside. · Try meditation or other relaxation techniques before you go to bed. · If you cannot fall asleep, get up and go to another room until you feel sleepy. Do something relaxing. Repeat your bedtime routine before you go to bed again. · Make your house quiet and calm about an hour before bedtime. Turn down the lights, turn off the TV, log off the computer, and turn down the volume on music. This can help you relax after a busy day.     Drowsy Driving  The [address] [place] [address] Pro[employer] Traffic Safety Administration cites drowsiness as a causing factor in more than 686,381 police reported crashes annually, resulting in 76,000 injuries and 1,500 deaths. Other surveys suggest 55% of people polled have driven while drowsy in the past year, 23% had fallen asleep but not crashed, 3% crashed, and 2% had and accident due to drowsy driving. Who is at risk? Young Drivers: One study of drowsy driving accidents states that 55% of the drivers were under 25 years. Of those, 75% were male. Shift Workers and Travelers: People who work overnight or travel across time zones frequently are at higher risk of experiencing Circadian Rhythm Disorders. They are trying to work and function when their body is programed to sleep. Sleep Deprived: Lack of sleep has a serious impact on your ability to pay attention or focus on a task. Consistently getting less than the average of 8 hours your body needs creates partial or cumulative sleep deprivation. Untreated Sleep Disorders: Sleep Apnea, Narcolepsy, R.L.S., and other sleep disorders (untreated) prevent a person from getting enough restful sleep. This leads to excessive daytime sleepiness and increases the risk for drowsy driving accidents by up to 7 times. Medications / Alcohol: Even over the counter medications can cause drowsiness. Medications that impair a drivers attention should have a warning label. Alcohol naturally makes you sleepy and on its own can cause accidents. Combined with excessive drowsiness its effects are amplified. Signs of Drowsy Driving:   * You don't remember driving the last few miles   * You may drift out of your edwin   * You are unable to focus and your thoughts wander   * You may yawn more often than normal   * You have difficulty keeping your eyes open / nodding off   * Missing traffic signs, speeding, or tailgating  Prevention-   Good sleep hygiene, lifestyle and behavioral choices have the most impact on drowsy driving.  There is no substitute for sleep and the average person requires 8 hours nightly. If you find yourself driving drowsy, stop and sleep. Consider the sleep hygiene tips provided during your visit as well. Medication Refill Policy: Refills for all medications require 1 week advance notice. Please have your pharmacy fax a refill request. We are unable to fax, or call in \"controled substance\" medications and you will need to pick these prescriptions up from our office. [email or web address] Activation    Thank you for requesting access to [employer]. Please follow the instructions below to securely access and download your online medical record. [employer] allows you to send messages to your doctor, view your test results, renew your prescriptions, schedule appointments, and more. How Do I Sign Up? 1. In your internet browser, go to [email or web address]. 2. Click on the First Time User? Click Here link in the Sign In box. You will see the New Member Sign Up page. 3. Enter your [employer] Access Code exactly as it appears below. You will not need to use this code after youve completed the sign-up process. If you do not sign up before the expiration date, you must request a new code. [employer] Access Code: Activation code not generated  Current [employer] Status: Active (This is the date your [employer] access code will )    4. Enter the last four digits of your Social Security Number (xxxx) and Date of Birth (mm/dd/yyyy) as indicated and click Submit. You will be taken to the next sign-up page. 5. Create a [employer] ID. This will be your [employer] login ID and cannot be changed, so think of one that is secure and easy to remember. 6. Create a [employer] password. You can change your password at any time. 7. Enter your Password Reset Question and Answer. This can be used at a later time if you forget your password. 8. Enter your e-mail address. You will receive e-mail notification when new information is available in [address]   9. Click Sign Up. You can now view and download portions of your medical record. 10. Click the Download Summary menu link to download a portable copy of your medical information. Additional Information    If you have questions, please call [phone]. Remember, [employer] is NOT to be used for urgent needs. For medical emergencies, dial 911.

## 2021-09-27 NOTE — PROGRESS NOTES
[address] [place], [address]  Tel.  [phone]  Fax. 100 [facility] 60  [place], [address]  Tel.  [phone]  Fax. [phone] [address]  Tel.  [phone]  Fax. [phone]     S>Anthony Hung is a 54 y.o. male seen for a positive airway pressure follow-up. He reports no problems using the device. The following problems are identified:    Drowsiness At times Problems exhaling no   Snoring no Forget to put on no   Mask Comfortable yes Can't fall asleep no   Dry Mouth no Mask falls off no   Air Leaking no Frequent awakenings no     Download reviewed. He admits that his sleep has improved. Therapy Apnea Index averaged over PAP use: 1 /hr which reflects significantly improved sleep breathing condition. Allergies   Allergen Reactions    Penicillins Nausea and Vomiting       He has a current medication list which includes the following prescription(s): taltz autoinjector (2 pack), bupropion xl, escitalopram oxalate, atorvastatin, mometasone, and cetirizine. .      He  has a past medical history of Headache. [place] Sleepiness Score: 12   and Modified F.O.S.Q. Score Total / 2: 17      O>    Visit Vitals  /79 (BP 1 Location: Right arm, BP Patient Position: Sitting, BP Cuff Size: Large adult)   Pulse 71   Temp 99.1 °F (37.3 °C) (Temporal)   Resp 20   Wt 272 lb 1.6 oz (123.4 kg)   SpO2 97%   BMI 37.95 kg/m²           General:   Alert, oriented, not in distress   Neck:   No JVD    Chest/Lungs:  symetrical lung expansion , no accessory muscle use    Extremities:  no obvious rashes , negative edema    Neuro:  No focal deficits ; No obvious tremor    Psych:  Normal affect ,  Normal countenance ;         A>    ICD-10-CM ICD-9-CM    1. Obstructive sleep apnea (adult) (pediatric)  G47.33 327.23 AMB SUPPLY ORDER     AHI = 47(6-19). On CPAP, Resmed :  7-16 cmH2O.     Compliant:      yes    Therapeutic Response:  Positive    P>      *   Follow-up and Dispositions    · Return in about 1 year (around 9/27/2022). change setting to 9-15 cmh20  I have ordered replacement supplies  he is compliant with PAP therapy and PAP continues to benefit patient and remains necessary for control of his sleep apnea. * He was asked to contact our office for any problems regarding PAP therapy. * Counseling was provided regarding the importance of regular PAP use and on proper sleep hygiene and safe driving. * Re-enforced proper and regular cleaning for the device. I have counseled the patient regarding the benefits of weight loss.     Electronically signed by    Kia Ken MD  Diplomate in Sleep Medicine  [facility]    9/27/2021

## 2021-11-22 ENCOUNTER — TRANSCRIBE ORDER (OUTPATIENT)
Dept: SCHEDULING | Age: 46
End: 2021-11-22

## 2021-11-22 DIAGNOSIS — Z12.11 COLON CANCER SCREENING: ICD-10-CM

## 2021-11-22 DIAGNOSIS — R13.10 DYSPHAGIA: Primary | ICD-10-CM

## 2021-12-09 ENCOUNTER — HOSPITAL ENCOUNTER (OUTPATIENT)
Dept: GENERAL RADIOLOGY | Age: 46
Discharge: HOME | End: 2021-12-09
Attending: INTERNAL MEDICINE
Payer: COMMERCIAL

## 2021-12-09 DIAGNOSIS — R13.10 DYSPHAGIA: ICD-10-CM

## 2021-12-09 DIAGNOSIS — Z12.11 COLON CANCER SCREENING: ICD-10-CM

## 2021-12-09 PROCEDURE — 74220 X-RAY XM ESOPHAGUS 1CNTRST: CPT

## 2022-03-18 PROBLEM — E66.01 SEVERE OBESITY (HCC): Status: ACTIVE | Noted: 2019-06-04

## 2022-03-18 PROBLEM — M26.609 TMJ (TEMPOROMANDIBULAR JOINT SYNDROME): Status: ACTIVE | Noted: 2020-10-01

## 2022-03-19 PROBLEM — Z82.3: Status: ACTIVE | Noted: 2020-10-01

## 2022-03-19 PROBLEM — G44.82 COITAL HEADACHE: Status: ACTIVE | Noted: 2020-10-01

## 2022-03-19 PROBLEM — G44.84 PRIMARY EXERTIONAL HEADACHE: Status: ACTIVE | Noted: 2020-10-01

## 2022-09-27 ENCOUNTER — DOCUMENTATION ONLY (OUTPATIENT)
Dept: SLEEP MEDICINE | Age: 47
End: 2022-09-27

## 2022-09-27 ENCOUNTER — OFFICE VISIT (OUTPATIENT)
Dept: SLEEP MEDICINE | Age: 47
End: 2022-09-27
Payer: COMMERCIAL

## 2022-09-27 VITALS
RESPIRATION RATE: 20 BRPM | DIASTOLIC BLOOD PRESSURE: 78 MMHG | BODY MASS INDEX: 39.7 KG/M2 | SYSTOLIC BLOOD PRESSURE: 131 MMHG | OXYGEN SATURATION: 97 % | HEART RATE: 71 BPM | TEMPERATURE: 98.2 F | WEIGHT: 283.6 LBS | HEIGHT: 71 IN

## 2022-09-27 DIAGNOSIS — E66.9 OBESITY, CLASS II, BMI 35-39.9: ICD-10-CM

## 2022-09-27 DIAGNOSIS — G47.00 INSOMNIA, UNSPECIFIED TYPE: ICD-10-CM

## 2022-09-27 DIAGNOSIS — G47.33 OBSTRUCTIVE SLEEP APNEA (ADULT) (PEDIATRIC): Primary | ICD-10-CM

## 2022-09-27 PROCEDURE — 99213 OFFICE O/P EST LOW 20 MIN: CPT | Performed by: INTERNAL MEDICINE

## 2022-09-27 RX ORDER — SERTRALINE HYDROCHLORIDE 100 MG/1
TABLET, FILM COATED ORAL
COMMUNITY
Start: 2022-09-14

## 2022-09-27 NOTE — PROGRESS NOTES
[address] [facility] [place], [address]  Tel.  [phone]  Fax. 100 [facility] 60  [place], [address] [facility]  Tel.  [phone]  Fax. [phone] [address]   Tel.  [phone]  Fax. [phone]     S>Anthony Ulrich is a 51 y.o. male seen for a positive airway pressure follow-up. He reports no problems using the device. The following problems are identified:    Drowsiness At times Problems exhaling no   Snoring no Forget to put on no   Mask Comfortable yes Can't fall asleep Yes-takes about 30-60 minutes to fall asleep   Dry Mouth no Mask falls off no   Air Leaking no Frequent awakenings no     Download reviewed. Weight up some - he is trying to lose weight now  He admits that his sleep has improved. Therapy Apnea Index averaged over PAP use: 1 /hr which reflects improved sleep breathing condition. Allergies   Allergen Reactions    Penicillins Nausea and Vomiting       He has a current medication list which includes the following prescription(s): sertraline, taltz autoinjector (2 pack), bupropion xl, atorvastatin, mometasone, and cetirizine. .      He  has a past medical history of Headache. [place] Sleepiness Score: 10   and Modified F.O.S.Q. Score Total / 2: 13.5   which reflect improved sleep quality over therapy time. O>    Visit Vitals  /78 (BP 1 Location: Right arm, BP Patient Position: Sitting, BP Cuff Size: Large adult)   Pulse 71   Temp 98.2 °F (36.8 °C) (Temporal)   Resp 20   Ht 5' 11\" (1.803 m)   Wt 283 lb 9.6 oz (128.6 kg)   SpO2 97%   BMI 39.55 kg/m²           General:   Alert, oriented, not in distress   Neck:   No JVD    Chest/Lungs:  symetrical lung expansion , no accessory muscle use    Extremities:  no obvious rashes , negative edema    Neuro:  No focal deficits ; No obvious tremor    Psych:  Normal affect ,  Normal countenance ;         A>    ICD-10-CM ICD-9-CM    1.  Obstructive sleep apnea (adult) (pediatric)  G47.33 327.23 AMB SUPPLY ORDER      2. Insomnia, unspecified type  G47.00 780.52       3. Obesity, Class II, BMI 35-39.9  E66.9 278.00         AHI = 47(6-19)   yes    Therapeutic Response:  Positive    P>      * he is compliant with PAP therapy and PAP continues to benefit patient and remains necessary for control of his sleep apnea. I have ordered replacement supplies    Follow-up and Dispositions    Return in about 1 year (around 9/27/2023). change setting to 9-16 cmH20    * He was asked to contact our office for any problems regarding PAP therapy. * Counseling was provided regarding the importance of regular PAP use and on proper sleep hygiene and safe driving. * Re-enforced proper and regular cleaning for the device. 2. Insomnia- I have advised a regular sleep wake cycle. He will get in bed at 9:45 - 10 pm.  A regular daily exercise schedule, at least 3 hours before bedtime, would be beneficial to improving sleep quality. .  Watching TV, using laptops, tablets and smartphones in the evening was discouraged. he  was advised to keep the bedroom cool and dark. Relaxation strategies and realistic expectations for sleep were reviewed today. .  All of his questions were addressed. 3. Obesity - weight has been increasing some. I have discussed the relationship of weight to obstructive sleep apnea. I have advised him to intensify his efforts at a weight loss. We discussed reducing portions and avoiding beverages with calories. Exercising 5-7 days a week can further assist with weight loss/maintenance and was recommended. he understands that weight loss can reduce severity of sleep apnea and snoring.    Electronically signed by    Trini Mendoza MD  Diplomate in Sleep Medicine  [facility]   9/27/2022

## 2022-09-27 NOTE — PATIENT INSTRUCTIONS
[address] [facility] [place], [address]  Tel.  [phone]  Fax. 100 [facility] 60  [place], [address]  Tel.  [phone]  Fax. [phone] [address]  Tel.  [phone]  Fax. [phone]     PROPER SLEEP HYGIENE    What to avoid  Do not have drinks with caffeine, such as coffee or black tea, for 8 hours before bed. Do not smoke or use other types of tobacco near bedtime. Nicotine is a stimulant and can keep you awake. Avoid drinking alcohol late in the evening, because it can cause you to wake in the middle of the night. Do not eat a big meal close to bedtime. If you are hungry, eat a light snack. Do not drink a lot of water close to bedtime, because the need to urinate may wake you up during the night. Do not read or watch TV in bed. Use the bed only for sleeping and sexual activity. What to try  Go to bed at the same time every night, and wake up at the same time every morning. Do not take naps during the day. Keep your bedroom quiet, dark, and cool. Get regular exercise, but not within 3 to 4 hours of your bedtime. .  Sleep on a comfortable pillow and mattress. If watching the clock makes you anxious, turn it facing away from you so you cannot see the time. If you worry when you lie down, start a worry book. Well before bedtime, write down your worries, and then set the book and your concerns aside. Try meditation or other relaxation techniques before you go to bed. If you cannot fall asleep, get up and go to another room until you feel sleepy. Do something relaxing. Repeat your bedtime routine before you go to bed again. Make your house quiet and calm about an hour before bedtime. Turn down the lights, turn off the TV, log off the computer, and turn down the volume on music. This can help you relax after a busy day.     Drowsy Driving  The [address] [place] [address] Road Traffic Safety Administration cites drowsiness as a causing factor in more than 522,579 police reported crashes annually, resulting in 76,000 injuries and 1,500 deaths. Other surveys suggest 55% of people polled have driven while drowsy in the past year, 23% had fallen asleep but not crashed, 3% crashed, and 2% had and accident due to drowsy driving. Who is at risk? Young Drivers: One study of drowsy driving accidents states that 55% of the drivers were under 25 years. Of those, 75% were male. Shift Workers and Travelers: People who work overnight or travel across time zones frequently are at higher risk of experiencing Circadian Rhythm Disorders. They are trying to work and function when their body is programed to sleep. Sleep Deprived: Lack of sleep has a serious impact on your ability to pay attention or focus on a task. Consistently getting less than the average of 8 hours your body needs creates partial or cumulative sleep deprivation. Untreated Sleep Disorders: Sleep Apnea, Narcolepsy, R.L.S., and other sleep disorders (untreated) prevent a person from getting enough restful sleep. This leads to excessive daytime sleepiness and increases the risk for drowsy driving accidents by up to 7 times. Medications / Alcohol: Even over the counter medications can cause drowsiness. Medications that impair a drivers attention should have a warning label. Alcohol naturally makes you sleepy and on its own can cause accidents. Combined with excessive drowsiness its effects are amplified. Signs of Drowsy Driving:   * You don't remember driving the last few miles   * You may drift out of your [place]   * You are unable to focus and your thoughts wander   * You may yawn more often than normal   * You have difficulty keeping your eyes open / nodding off   * Missing traffic signs, speeding, or tailgating  Prevention-   Good sleep hygiene, lifestyle and behavioral choices have the most impact on drowsy driving.  There is no substitute for sleep and the average person requires 8 hours nightly. If you find yourself driving drowsy, stop and sleep. Consider the sleep hygiene tips provided during your visit as well. Medication Refill Policy: Refills for all medications require 1 week advance notice. Please have your pharmacy fax a refill request. We are unable to fax, or call in \"controled substance\" medications and you will need to pick these prescriptions up from our office. makerist Activation    Thank you for requesting access to makerist. Please follow the instructions below to securely access and download your online medical record. makerist allows you to send messages to your doctor, view your test results, renew your prescriptions, schedule appointments, and more. How Do I Sign Up? In your internet browser, go to [email or web address]. Click on the First Time User? Click Here link in the Sign In box. You will see the New Member Sign Up page. Enter your makerist Access Code exactly as it appears below. You will not need to use this code after youve completed the sign-up process. If you do not sign up before the expiration date, you must request a new code. makerist Access Code: Activation code not generated  Current makerist Status: Active (This is the date your makerist access code will )    Enter the last four digits of your Social Security Number (xxxx) and Date of Birth (mm/dd/yyyy) as indicated and click Submit. You will be taken to the next sign-up page. Create a makerist ID. This will be your makerist login ID and cannot be changed, so think of one that is secure and easy to remember. Create a makerist password. You can change your password at any time. Enter your Password Reset Question and Answer. This can be used at a later time if you forget your password. Enter your e-mail address. You will receive e-mail notification when new information is available in [address] Click Sign Up.  You can now view and download portions of your medical record. Click the [facility] link to download a portable copy of your medical information. Additional Information    If you have questions, please call [phone]. Remember, [employer] is NOT to be used for urgent needs. For medical emergencies, dial 911.

## 2023-10-02 ENCOUNTER — TELEMEDICINE (OUTPATIENT)
Age: 48
End: 2023-10-02

## 2023-10-02 DIAGNOSIS — G47.33 OBSTRUCTIVE SLEEP APNEA (ADULT) (PEDIATRIC): Primary | ICD-10-CM

## 2023-10-02 DIAGNOSIS — E66.9 OBESITY, CLASS I, BMI 30-34.9: ICD-10-CM

## 2023-10-02 PROCEDURE — 99213 OFFICE O/P EST LOW 20 MIN: CPT | Performed by: INTERNAL MEDICINE

## 2023-10-02 ASSESSMENT — SLEEP AND FATIGUE QUESTIONNAIRES
HOW LIKELY ARE YOU TO NOD OFF OR FALL ASLEEP WHILE SITTING AND TALKING TO SOMEONE: 0
ESS TOTAL SCORE: 10
HOW LIKELY ARE YOU TO NOD OFF OR FALL ASLEEP WHILE LYING DOWN TO REST IN THE AFTERNOON WHEN CIRCUMSTANCES PERMIT: 3
HOW LIKELY ARE YOU TO NOD OFF OR FALL ASLEEP WHILE WATCHING TV: 2
HOW LIKELY ARE YOU TO NOD OFF OR FALL ASLEEP WHILE SITTING AND READING: 2
HOW LIKELY ARE YOU TO NOD OFF OR FALL ASLEEP WHILE SITTING INACTIVE IN A PUBLIC PLACE: 0
HOW LIKELY ARE YOU TO NOD OFF OR FALL ASLEEP WHEN YOU ARE A PASSENGER IN A CAR FOR AN HOUR WITHOUT A BREAK: 0
HOW LIKELY ARE YOU TO NOD OFF OR FALL ASLEEP IN A CAR, WHILE STOPPED FOR A FEW MINUTES IN TRAFFIC: 0
HOW LIKELY ARE YOU TO NOD OFF OR FALL ASLEEP WHILE SITTING QUIETLY AFTER LUNCH WITHOUT ALCOHOL: 3

## 2023-10-02 NOTE — PATIENT INSTRUCTIONS
causing factor in more than 077,785 police reported crashes annually, resulting in 76,000 injuries and 1,500 deaths. Other surveys suggest 55% of people polled have driven while drowsy in the past year, 23% had fallen asleep but not crashed, 3% crashed, and 2% had and accident due to drowsy driving. Who is at risk? Young Drivers: One study of drowsy driving accidents states that 55% of the drivers were under 25 years. Of those, 75% were male. Shift Workers and Travelers: People who work overnight or travel across time zones frequently are at higher risk of experiencing Circadian Rhythm Disorders. They are trying to work and function when their body is programed to sleep. Sleep Deprived: Lack of sleep has a serious impact on your ability to pay attention or focus on a task. Consistently getting less than the average of 8 hours your body needs creates partial or cumulative sleep deprivation. Untreated Sleep Disorders: Sleep Apnea, Narcolepsy, R.L.S., and other sleep disorders (untreated) prevent a person from getting enough restful sleep. This leads to excessive daytime sleepiness and increases the risk for drowsy driving accidents by up to 7 times. Medications / Alcohol: Even over the counter medications can cause drowsiness. Medications that impair a drivers attention should have a warning label. Alcohol naturally makes you sleepy and on its own can cause accidents. Combined with excessive drowsiness its effects are amplified. Signs of Drowsy Driving:   * You don't remember driving the last few miles   * You may drift out of your jorge alberto   * You are unable to focus and your thoughts wander   * You may yawn more often than normal   * You have difficulty keeping your eyes open / nodding off   * Missing traffic signs, speeding, or tailgating  Prevention-   Good sleep hygiene, lifestyle and behavioral choices have the most impact on drowsy driving.  There is no substitute for sleep and the average person

## 2023-10-02 NOTE — PROGRESS NOTES
errors are inadvertently transcribed by the computer software. Please excuse any errors that have escaped final proofreading.)

## 2024-07-25 ENCOUNTER — HOSPITAL ENCOUNTER (OUTPATIENT)
Facility: HOSPITAL | Age: 49
Discharge: HOME | End: 2024-07-25
Attending: FAMILY MEDICINE
Payer: COMMERCIAL

## 2024-07-25 DIAGNOSIS — M76.822 LEFT TIBIALIS TENDINITIS: ICD-10-CM

## 2024-07-25 DIAGNOSIS — M72.2 PLANTAR FASCIITIS OF LEFT FOOT: ICD-10-CM

## 2024-07-25 PROCEDURE — 73718 MRI LOWER EXTREMITY W/O DYE: CPT

## 2024-11-11 ASSESSMENT — SLEEP AND FATIGUE QUESTIONNAIRES
HOW LIKELY ARE YOU TO NOD OFF OR FALL ASLEEP WHILE SITTING AND TALKING TO SOMEONE: SLIGHT CHANCE OF DOZING
DO YOU HAVE DIFFICULTY CONCENTRATING ON THE THINGS YOU DO BECAUSE YOU ARE SLEEPY OR TIRED: YES, MODERATE
HOW LIKELY ARE YOU TO NOD OFF OR FALL ASLEEP WHILE SITTING AND TALKING TO SOMEONE: SLIGHT CHANCE OF DOZING
DO YOU HAVE DIFFICULTY OPERATING A MOTOR VEHICLE FOR LONG DISTANCES (GREATER THAN 100 MILES) BECAUSE YOU BECOME SLEEPY: NO
HOW LIKELY ARE YOU TO NOD OFF OR FALL ASLEEP WHEN YOU ARE A PASSENGER IN A CAR FOR AN HOUR WITHOUT A BREAK: SLIGHT CHANCE OF DOZING
DO YOU HAVE DIFFICULTY WATCHING A MOVIE OR VIDEO BECAUSE YOU BECOME SLEEPY OR TIRED: YES, A LITTLE
HOW LIKELY ARE YOU TO NOD OFF OR FALL ASLEEP IN A CAR, WHILE STOPPED FOR A FEW MINUTES IN TRAFFIC: WOULD NEVER DOZE
HOW LIKELY ARE YOU TO NOD OFF OR FALL ASLEEP WHILE WATCHING TV: SLIGHT CHANCE OF DOZING
HAS YOUR MOOD BEEN AFFECTED BECAUSE YOU ARE SLEEPY OR TIRED: YES, MODERATE
DO YOU HAVE DIFFICULTY BEING AS ACTIVE AS YOU WANT TO BE IN THE MORNING BECAUSE YOU ARE SLEEPY OR TIRED: YES, MODERATE
HOW LIKELY ARE YOU TO NOD OFF OR FALL ASLEEP WHILE LYING DOWN TO REST IN THE AFTERNOON WHEN CIRCUMSTANCES PERMIT: HIGH CHANCE OF DOZING
FOSQ SCORE: 14
HOW LIKELY ARE YOU TO NOD OFF OR FALL ASLEEP WHILE SITTING AND READING: MODERATE CHANCE OF DOZING
HOW LIKELY ARE YOU TO NOD OFF OR FALL ASLEEP WHILE SITTING QUIETLY AFTER LUNCH WITHOUT ALCOHOL: MODERATE CHANCE OF DOZING
HOW LIKELY ARE YOU TO NOD OFF OR FALL ASLEEP WHILE SITTING AND READING: MODERATE CHANCE OF DOZING
DO YOU GENERALLY HAVE DIFFICULTY REMEMBERING THINGS BECAUSE YOU ARE SLEEPY OR TIRED: YES, MODERATE
HOW LIKELY ARE YOU TO NOD OFF OR FALL ASLEEP WHILE LYING DOWN TO REST IN THE AFTERNOON WHEN CIRCUMSTANCES PERMIT: HIGH CHANCE OF DOZING
ESS TOTAL SCORE: 11
HOW LIKELY ARE YOU TO NOD OFF OR FALL ASLEEP IN A CAR, WHILE STOPPED FOR A FEW MINUTES IN TRAFFIC: WOULD NEVER DOZE
DO YOU HAVE DIFFICULTY OPERATING A MOTOR VEHICLE FOR SHORT DISTANCES (LESS THAN 100 MILES) BECAUSE YOU BECOME SLEEPY: NO
HOW LIKELY ARE YOU TO NOD OFF OR FALL ASLEEP WHILE WATCHING TV: SLIGHT CHANCE OF DOZING
DO YOU HAVE DIFFICULTY VISITING YOUR FAMILY OR FRIENDS IN THEIR HOME BECAUSE YOU BECOME SLEEPY OR TIRED: YES, A LITTLE
HOW LIKELY ARE YOU TO NOD OFF OR FALL ASLEEP WHEN YOU ARE A PASSENGER IN A CAR FOR AN HOUR WITHOUT A BREAK: SLIGHT CHANCE OF DOZING
HOW LIKELY ARE YOU TO NOD OFF OR FALL ASLEEP WHILE SITTING INACTIVE IN A PUBLIC PLACE: SLIGHT CHANCE OF DOZING
HOW LIKELY ARE YOU TO NOD OFF OR FALL ASLEEP WHILE SITTING INACTIVE IN A PUBLIC PLACE: SLIGHT CHANCE OF DOZING
DO YOU HAVE DIFFICULTY BEING AS ACTIVE AS YOU WANT TO BE IN THE EVENING BECAUSE YOU ARE SLEEPY OR TIRED: YES, LITTLE
HAS YOUR RELATIONSHIP WITH FAMILY, FRIENDS OR WORK COLLEAGUES BEEN AFFECTED BECAUSE YOU ARE SLEEPY OR TIRED: YES, A LITTLE

## 2024-11-12 ENCOUNTER — TELEMEDICINE (OUTPATIENT)
Age: 49
End: 2024-11-12
Payer: COMMERCIAL

## 2024-11-12 DIAGNOSIS — G47.33 OBSTRUCTIVE SLEEP APNEA (ADULT) (PEDIATRIC): Primary | ICD-10-CM

## 2024-11-12 PROCEDURE — 99214 OFFICE O/P EST MOD 30 MIN: CPT | Performed by: INTERNAL MEDICINE

## 2024-11-12 NOTE — PROGRESS NOTES
Jermain Matias, was evaluated through a synchronous (real-time) audio-video encounter. The patient (or guardian if applicable) is aware that this is a billable service, which includes applicable co-pays. This Virtual Visit was conducted with patient's (and/or legal guardian's) consent. Patient identification was verified, and a caregiver was present when appropriate.   The patient was located at Home: [address] [address]  Provider was located at Home (Appt Dept State): [place]  Confirm you are appropriately licensed, registered, or certified to deliver care in the state where the patient is located as indicated above. If you are not or unsure, please re-schedule the visit: Yes, I confirm.      Total time spent for this encounter: Not billed by time    --Ashtyn Aleman MD on 11/12/2024 at 4:40 PM    An electronic signature was used to authenticate this note.'                    [address]  [place] [address]  Tel.  [phone]  Fax. [phone] [address]  [place] [address]  Tel.  [phone]  Fax. [phone] [address] [place] [place] [address]  Tel.  [phone]  Fax. [phone]       Telemedicine visit performed with verbal consent of the patient.  Patient called and identity confirmed with 2 patient identifers          S>Jermain Matias is a 48 y.o. male seen at this telemedicine visit for a positive airway pressure follow-up.  He reports no problems using the device.  He is not compliant over the past 180 days.  The following problems are identified:    Drowsiness no Problems exhaling no   Snoring no Forget to put on no   Mask Comfortable yes Can't fall asleep no   Dry Mouth no Mask falls off no   Air Leaking no Frequent awakenings no       Download reviewed from one device. He has been using his other machine without remote access but he does use PAP nigjhtly  Estimated AHI flow from download shows 0.6/hour.   rare significant leak  Averaging almost

## 2024-11-12 NOTE — PATIENT INSTRUCTIONS
[address]  [place] [address]  Tel.  [phone]  Fax. [phone] [address]  [place] [address]  Tel.  [phone]  Fax. [phone] [address] [place] [address]  Tel.  [phone]  Fax. [phone]     PROPER SLEEP HYGIENE    What to avoid  Do not have drinks with caffeine, such as coffee or black tea, for 8 hours before bed.  Do not smoke or use other types of tobacco near bedtime. Nicotine is a stimulant and can keep you awake.  Avoid drinking alcohol late in the evening, because it can cause you to wake in the middle of the night.  Do not eat a big meal close to bedtime. If you are hungry, eat a light snack.  Do not drink a lot of water close to bedtime, because the need to urinate may wake you up during the night.  Do not read or watch TV in bed. Use the bed only for sleeping and sexual activity.  What to try  Go to bed at the same time every night, and wake up at the same time every morning. Do not take naps during the day.  Keep your bedroom quiet, dark, and cool.  Get regular exercise, but not within 3 to 4 hours of your bedtime..  Sleep on a comfortable pillow and mattress.  If watching the clock makes you anxious, turn it facing away from you so you cannot see the time.  If you worry when you lie down, start a worry book. Well before bedtime, write down your worries, and then set the book and your concerns aside.  Try meditation or other relaxation techniques before you go to bed.  If you cannot fall asleep, get up and go to another room until you feel sleepy. Do something relaxing. Repeat your bedtime routine before you go to bed again.  Make your house quiet and calm about an hour before bedtime. Turn down the lights, turn off the TV, log off the computer, and turn down the volume on music. This can help you relax after a busy day.    Drowsy Driving  The [place] National Highway Traffic Safety Administration cites drowsiness as a